# Patient Record
Sex: FEMALE | Race: WHITE | Employment: OTHER | URBAN - METROPOLITAN AREA
[De-identification: names, ages, dates, MRNs, and addresses within clinical notes are randomized per-mention and may not be internally consistent; named-entity substitution may affect disease eponyms.]

---

## 2017-02-01 ENCOUNTER — HOSPITAL ENCOUNTER (OUTPATIENT)
Dept: WOMENS IMAGING | Age: 82
Discharge: HOME | End: 2017-02-01
Payer: MEDICARE

## 2017-02-01 DIAGNOSIS — Z12.39 BREAST CANCER SCREENING: ICD-10-CM

## 2017-02-01 PROCEDURE — G0202 SCR MAMMO BI INCL CAD: HCPCS

## 2018-08-06 ENCOUNTER — OFFICE VISIT (OUTPATIENT)
Dept: CARDIOLOGY CLINIC | Age: 83
End: 2018-08-06
Payer: MEDICARE

## 2018-08-06 VITALS
DIASTOLIC BLOOD PRESSURE: 48 MMHG | HEART RATE: 65 BPM | RESPIRATION RATE: 14 BRPM | WEIGHT: 106.6 LBS | HEIGHT: 60 IN | OXYGEN SATURATION: 98 % | SYSTOLIC BLOOD PRESSURE: 108 MMHG | BODY MASS INDEX: 20.93 KG/M2

## 2018-08-06 DIAGNOSIS — I35.0 NONRHEUMATIC AORTIC VALVE STENOSIS: ICD-10-CM

## 2018-08-06 DIAGNOSIS — K27.9 PUD (PEPTIC ULCER DISEASE): ICD-10-CM

## 2018-08-06 LAB
ALBUMIN SERPL-MCNC: 4.1 G/DL (ref 3.9–4.9)
ALP BLD-CCNC: 46 U/L (ref 40–130)
ALT SERPL-CCNC: 13 U/L (ref 0–33)
ANION GAP SERPL CALCULATED.3IONS-SCNC: 15 MEQ/L (ref 7–13)
AST SERPL-CCNC: 31 U/L (ref 0–35)
BASOPHILS ABSOLUTE: 0 K/UL (ref 0–0.2)
BASOPHILS RELATIVE PERCENT: 0.6 %
BILIRUB SERPL-MCNC: 0.3 MG/DL (ref 0–1.2)
BUN BLDV-MCNC: 14 MG/DL (ref 8–23)
CALCIUM SERPL-MCNC: 9.1 MG/DL (ref 8.6–10.2)
CHLORIDE BLD-SCNC: 94 MEQ/L (ref 98–107)
CO2: 21 MEQ/L (ref 22–29)
CREAT SERPL-MCNC: 0.42 MG/DL (ref 0.5–0.9)
EOSINOPHILS ABSOLUTE: 0.1 K/UL (ref 0–0.7)
EOSINOPHILS RELATIVE PERCENT: 2.8 %
GFR AFRICAN AMERICAN: >60
GFR NON-AFRICAN AMERICAN: >60
GLOBULIN: 2.2 G/DL (ref 2.3–3.5)
GLUCOSE BLD-MCNC: 87 MG/DL (ref 74–109)
HCT VFR BLD CALC: 37.5 % (ref 37–47)
HEMOGLOBIN: 12.9 G/DL (ref 12–16)
LYMPHOCYTES ABSOLUTE: 1 K/UL (ref 1–4.8)
LYMPHOCYTES RELATIVE PERCENT: 18.9 %
MCH RBC QN AUTO: 33 PG (ref 27–31.3)
MCHC RBC AUTO-ENTMCNC: 34.5 % (ref 33–37)
MCV RBC AUTO: 95.7 FL (ref 82–100)
MONOCYTES ABSOLUTE: 0.7 K/UL (ref 0.2–0.8)
MONOCYTES RELATIVE PERCENT: 13.6 %
NEUTROPHILS ABSOLUTE: 3.3 K/UL (ref 1.4–6.5)
NEUTROPHILS RELATIVE PERCENT: 64.1 %
PDW BLD-RTO: 12.7 % (ref 11.5–14.5)
PLATELET # BLD: 217 K/UL (ref 130–400)
POTASSIUM SERPL-SCNC: 5.3 MEQ/L (ref 3.5–5.1)
RBC # BLD: 3.91 M/UL (ref 4.2–5.4)
SODIUM BLD-SCNC: 130 MEQ/L (ref 132–144)
TOTAL PROTEIN: 6.3 G/DL (ref 6.4–8.1)
WBC # BLD: 5.1 K/UL (ref 4.8–10.8)

## 2018-08-06 PROCEDURE — 99213 OFFICE O/P EST LOW 20 MIN: CPT | Performed by: INTERNAL MEDICINE

## 2018-08-06 RX ORDER — OXYBUTYNIN CHLORIDE 10 MG/1
10 TABLET, EXTENDED RELEASE ORAL DAILY
Refills: 3 | COMMUNITY
Start: 2018-07-19 | End: 2019-01-10 | Stop reason: ALTCHOICE

## 2018-08-06 RX ORDER — SIMVASTATIN 20 MG
20 TABLET ORAL NIGHTLY
Refills: 2 | COMMUNITY
Start: 2018-05-21 | End: 2018-11-05 | Stop reason: SDUPTHER

## 2018-08-06 RX ORDER — NITROGLYCERIN 0.4 MG/1
0.4 TABLET SUBLINGUAL EVERY 5 MIN PRN
COMMUNITY

## 2018-08-06 RX ORDER — OMEPRAZOLE 20 MG/1
20 CAPSULE, DELAYED RELEASE ORAL DAILY
COMMUNITY

## 2018-08-06 RX ORDER — METOPROLOL SUCCINATE 25 MG/1
25 TABLET, EXTENDED RELEASE ORAL DAILY
Refills: 1 | COMMUNITY
Start: 2018-05-21 | End: 2018-11-05 | Stop reason: SDUPTHER

## 2018-08-06 RX ORDER — HYDROCODONE BITARTRATE AND ACETAMINOPHEN 5; 325 MG/1; MG/1
1 TABLET ORAL EVERY 6 HOURS PRN
COMMUNITY
End: 2018-11-05 | Stop reason: ALTCHOICE

## 2018-08-06 RX ORDER — LEVOTHYROXINE SODIUM 100 UG/1
0.1 CAPSULE ORAL DAILY
COMMUNITY

## 2018-08-06 RX ORDER — SELENIUM 50 MCG
TABLET ORAL DAILY
Refills: 0 | COMMUNITY
Start: 2018-07-17

## 2018-08-06 RX ORDER — LISINOPRIL 10 MG/1
10 TABLET ORAL DAILY
Refills: 1 | COMMUNITY
Start: 2018-05-21 | End: 2018-11-05 | Stop reason: SDUPTHER

## 2018-08-06 RX ORDER — LEVOTHYROXINE SODIUM 88 UG/1
88 TABLET ORAL DAILY
Refills: 1 | COMMUNITY
Start: 2018-05-07 | End: 2018-08-06 | Stop reason: DRUGHIGH

## 2018-08-06 ASSESSMENT — ENCOUNTER SYMPTOMS
ANAL BLEEDING: 0
ABDOMINAL PAIN: 0
ABDOMINAL DISTENTION: 0
BACK PAIN: 0
EYE PAIN: 0
SHORTNESS OF BREATH: 1
BLOOD IN STOOL: 0
COLOR CHANGE: 0
EYE DISCHARGE: 0

## 2018-08-06 NOTE — PROGRESS NOTES
Subjective:      Patient ID: Sparkle Seymour is a 80 y.o. female who presents today for:  Chief Complaint   Patient presents with    Heart Murmur     Aortic Stenosis    Abnormal Test Results     Abnormal EKG PRWP ETTT Neg 2010         HPI         Sparkle Seymour is a 80 y.o. female who presents for evaluation of shortness of breath. Dyspnea has been of mild severity, generally lasting 2 minute. Episodes usually occur intermittently and have been unchanged. Episodes have been associated with chest pain (Location: epigastric area, Quality: brief) and No other symptoms. .  The symptoms are aggravated by nothing, and relieved by nothing. Allergies   Allergen Reactions    Isoflavones      Other reaction(s): Other: See Comments  ASTHMATIC REACTION    Oatmeal      Other reaction(s): Other: See Comments  Pt states OATS and OATMEAL   causes ASTHMATIC REACTION    Other      Other reaction(s): Other: See Comments  Pt states COFFEE, TEA    causes ASTHMATIC REACTION    Prune      Other reaction(s): Other: See Comments  ASTHMATIC REACTION    Sweet Potato      Other reaction(s): Other: See Comments  CASUSES ASTHMATIC REACTION    Tomato      Other reaction(s): Other: See Comments  Fresh tomatoes cause ASTHMATIC REACTION           Past Medical History:   Diagnosis Date    Asthma     Colon abnormality     Hypertension     Thyroid disease      Past Surgical History:   Procedure Laterality Date    CHOLECYSTECTOMY      DIAGNOSTIC CARDIAC CATH LAB PROCEDURE  07/10/2007/9/21/13    EYE SURGERY      HAND FUSION Right     HYSTERECTOMY      JOINT REPLACEMENT Left 06/05/2006    hip    SHOULDER SURGERY Left     9/10/2013     Social History     Social History    Marital status: Single     Spouse name: N/A    Number of children: N/A    Years of education: N/A     Occupational History    Not on file.      Social History Main Topics    Smoking status: Never Smoker    Smokeless tobacco: Never Used    Alcohol use No    Drug use: Unknown    Sexual activity: Not on file     Other Topics Concern    Not on file     Social History Narrative    No narrative on file     Family History   Problem Relation Age of Onset    Asthma Mother     Cancer Mother         ovarian     Allergies   Allergen Reactions    Isoflavones      Other reaction(s): Other: See Comments  ASTHMATIC REACTION    Oatmeal      Other reaction(s): Other: See Comments  Pt states OATS and OATMEAL   causes ASTHMATIC REACTION    Other      Other reaction(s): Other: See Comments  Pt states COFFEE, TEA    causes ASTHMATIC REACTION    Prune      Other reaction(s): Other: See Comments  ASTHMATIC REACTION    Sweet Potato      Other reaction(s): Other: See Comments  CASUSES ASTHMATIC REACTION    Tomato      Other reaction(s): Other: See Comments  Fresh tomatoes cause ASTHMATIC REACTION         Review of Systems   Constitutional: Positive for fatigue (No change. ). Negative for activity change, appetite change, chills, diaphoresis, fever and unexpected weight change. HENT: Negative for congestion. Eyes: Negative for pain and discharge. Respiratory: Positive for shortness of breath (fair for her age. ). Cardiovascular: Negative for chest pain, palpitations and leg swelling. Gastrointestinal: Negative for abdominal distention, abdominal pain, anal bleeding and blood in stool. Endocrine: Negative for cold intolerance, heat intolerance, polydipsia, polyphagia and polyuria. Genitourinary: Negative for hematuria. Musculoskeletal: Negative for back pain and gait problem. Skin: Negative for color change. Neurological: Negative for dizziness, tremors, seizures, syncope, facial asymmetry, speech difficulty, numbness and headaches.        Objective:   BP (!) 108/48 (Site: Right Arm, Position: Sitting, Cuff Size: Medium Adult)   Pulse 65   Resp 14   Ht 5' (1.524 m)   Wt 106 lb 9.6 oz (48.4 kg)   LMP  (LMP Unknown)   SpO2 98%   BMI 20.82 kg/m²     Physical

## 2018-11-05 ENCOUNTER — OFFICE VISIT (OUTPATIENT)
Dept: CARDIOLOGY CLINIC | Age: 83
End: 2018-11-05
Payer: MEDICARE

## 2018-11-05 VITALS
DIASTOLIC BLOOD PRESSURE: 60 MMHG | HEIGHT: 60 IN | OXYGEN SATURATION: 97 % | BODY MASS INDEX: 21.13 KG/M2 | SYSTOLIC BLOOD PRESSURE: 110 MMHG | WEIGHT: 107.6 LBS | HEART RATE: 66 BPM

## 2018-11-05 DIAGNOSIS — R53.83 FATIGUE, UNSPECIFIED TYPE: Primary | ICD-10-CM

## 2018-11-05 DIAGNOSIS — R01.1 SYSTOLIC MURMUR: ICD-10-CM

## 2018-11-05 PROCEDURE — 99213 OFFICE O/P EST LOW 20 MIN: CPT | Performed by: INTERNAL MEDICINE

## 2018-11-05 RX ORDER — LISINOPRIL 10 MG/1
10 TABLET ORAL DAILY
Qty: 30 TABLET | Refills: 5 | Status: SHIPPED | OUTPATIENT
Start: 2018-11-05 | End: 2019-03-11 | Stop reason: SDUPTHER

## 2018-11-05 RX ORDER — METOPROLOL SUCCINATE 25 MG/1
25 TABLET, EXTENDED RELEASE ORAL DAILY
Qty: 30 TABLET | Refills: 5 | Status: SHIPPED | OUTPATIENT
Start: 2018-11-05

## 2018-11-05 RX ORDER — SIMVASTATIN 20 MG
20 TABLET ORAL NIGHTLY
Qty: 30 TABLET | Refills: 5 | Status: SHIPPED | OUTPATIENT
Start: 2018-11-05 | End: 2019-05-07 | Stop reason: SDUPTHER

## 2018-11-12 ENCOUNTER — HOSPITAL ENCOUNTER (OUTPATIENT)
Dept: NON INVASIVE DIAGNOSTICS | Age: 83
Discharge: HOME | End: 2018-11-12
Payer: MEDICARE

## 2018-11-12 DIAGNOSIS — R53.83 FATIGUE, UNSPECIFIED TYPE: ICD-10-CM

## 2018-11-12 DIAGNOSIS — R01.1 SYSTOLIC MURMUR: ICD-10-CM

## 2018-11-12 LAB
LV EF: 60 %
LVEF MODALITY: NORMAL

## 2018-11-12 PROCEDURE — 93306 TTE W/DOPPLER COMPLETE: CPT

## 2019-01-10 DIAGNOSIS — C56.9 MALIGNANT NEOPLASM OF OVARY, UNSPECIFIED LATERALITY (HCC): ICD-10-CM

## 2019-01-10 LAB
ALBUMIN SERPL-MCNC: 4.3 G/DL (ref 3.9–4.9)
ALP BLD-CCNC: 47 U/L (ref 40–130)
ALT SERPL-CCNC: 12 U/L (ref 0–33)
ANION GAP SERPL CALCULATED.3IONS-SCNC: 13 MEQ/L (ref 7–13)
AST SERPL-CCNC: 24 U/L (ref 0–35)
BILIRUB SERPL-MCNC: 0.4 MG/DL (ref 0–1.2)
BUN BLDV-MCNC: 15 MG/DL (ref 8–23)
CALCIUM SERPL-MCNC: 9.3 MG/DL (ref 8.6–10.2)
CHLORIDE BLD-SCNC: 98 MEQ/L (ref 98–107)
CO2: 22 MEQ/L (ref 22–29)
CREAT SERPL-MCNC: 0.56 MG/DL (ref 0.5–0.9)
GFR AFRICAN AMERICAN: >60
GFR NON-AFRICAN AMERICAN: >60
GLOBULIN: 2.5 G/DL (ref 2.3–3.5)
GLUCOSE BLD-MCNC: 143 MG/DL (ref 74–109)
POTASSIUM SERPL-SCNC: 4.1 MEQ/L (ref 3.5–5.1)
SODIUM BLD-SCNC: 133 MEQ/L (ref 132–144)
TOTAL PROTEIN: 6.8 G/DL (ref 6.4–8.1)

## 2019-03-11 ENCOUNTER — OFFICE VISIT (OUTPATIENT)
Dept: CARDIOLOGY CLINIC | Age: 84
End: 2019-03-11
Payer: MEDICARE

## 2019-03-11 VITALS
BODY MASS INDEX: 21.36 KG/M2 | WEIGHT: 108.8 LBS | DIASTOLIC BLOOD PRESSURE: 60 MMHG | HEART RATE: 70 BPM | HEIGHT: 60 IN | OXYGEN SATURATION: 98 % | SYSTOLIC BLOOD PRESSURE: 110 MMHG

## 2019-03-11 DIAGNOSIS — R01.1 SYSTOLIC MURMUR: Primary | ICD-10-CM

## 2019-03-11 DIAGNOSIS — R53.83 FATIGUE, UNSPECIFIED TYPE: ICD-10-CM

## 2019-03-11 PROCEDURE — 99213 OFFICE O/P EST LOW 20 MIN: CPT | Performed by: INTERNAL MEDICINE

## 2019-03-11 RX ORDER — LISINOPRIL 5 MG/1
5 TABLET ORAL DAILY
Qty: 90 TABLET | Refills: 1 | Status: SHIPPED | OUTPATIENT
Start: 2019-03-11

## 2019-03-11 RX ORDER — LISINOPRIL 10 MG/1
TABLET ORAL
Qty: 30 TABLET | Refills: 5 | Status: SHIPPED | OUTPATIENT
Start: 2019-03-11 | End: 2019-03-11

## 2019-03-11 ASSESSMENT — ENCOUNTER SYMPTOMS
CHEST TIGHTNESS: 0
SHORTNESS OF BREATH: 0
APNEA: 0

## 2019-03-11 NOTE — PROGRESS NOTES
[facility] Cardiology Progress Note  Patient: Marni Freed  YOB: 1935  MRN: [id number]    Chief Complaint:  Chief Complaint   Patient presents with    Coronary Artery Disease   11/5/18 Stent is seen in the office for her continued problem with fatigue and dizziness. She states when she wakes up in the morning she feels very tired. She takes Tylenol arthritis in the morning. In the past she has been taking Norco.  She denies any chest pain palpitations syncope or near syncope although she gets dizzy when she is working with the arms above the head. This has been a long-term problem for her.         Subjective/HPI: 3/11/2019  Marni Freed  Pryor STABLE. nO cp. Pryor IS STABLE. dENIES ANY OTHER CARDIAC SXS     Discussion/Plan CONTINUE MEDICAL MANAGEMENT. STRESS TEST WANTS TO WAIT.     Past Medical History:   Diagnosis Date    Asthma     Colon abnormality     Hypertension     Thyroid disease        Past Surgical History:   Procedure Laterality Date    CHOLECYSTECTOMY      DIAGNOSTIC CARDIAC CATH LAB PROCEDURE  07/10/2007/9/21/13    EYE SURGERY      HAND FUSION Right     HYSTERECTOMY      JOINT REPLACEMENT Left 06/05/2006    hip    SHOULDER SURGERY Left     9/10/2013       Family History   Problem Relation Age of Onset    Asthma Mother     Cancer Mother         ovarian       Social History     Socioeconomic History    Marital status: Single     Spouse name: None    Number of children: None    Years of education: None    Highest education level: None   Occupational History    None   Social Needs    Financial resource strain: None    Food insecurity:     Worry: None     Inability: None    Transportation needs:     Medical: None     Non-medical: None   Tobacco Use    Smoking status: Never Smoker    Smokeless tobacco: Never Used   Substance and Sexual Activity    Alcohol use: No    Drug use: None    Sexual activity: None   Lifestyle    Physical activity:     Days per week: None     Minutes per session: None    Stress: None   Relationships    Social connections:     Talks on phone: None     Gets together: None     Attends Qu[employer] service: None     Active member of club or organization: None     Attends meetings of clubs or organizations: None     Relationship status: None    Intimate partner violence:     Fear of current or ex partner: None     Emotionally abused: None     Physically abused: None     Forced sexual activity: None   Other Topics Concern    None   Social History Narrative    None       Allergies   Allergen Reactions    Isoflavones      Other reaction(s): Other: See Comments  ASTHMATIC REACTION    Oatmeal      Other reaction(s): Other: See Comments  Pt states OATS and OATMEAL   causes ASTHMATIC REACTION    Other      Other reaction(s): Other: See Comments  Pt states COFFEE, TEA    causes ASTHMATIC REACTION    Prune      Other reaction(s): Other: See Comments  ASTHMATIC REACTION    Sweet Potato      Other reaction(s): Other: See Comments  CASUSES ASTHMATIC REACTION    Tomato      Other reaction(s): Other: See Comments  Fresh tomatoes cause ASTHMATIC REACTION       Current Outpatient Medications   Medication Sig Dispense Refill    lisinopril (PRINIVIL;ZESTRIL) 10 MG tablet 1/2 TAB DAILY 30 tablet 5    Multiple Vitamins-Minerals (ICAPS LUTEIN & OMEGA-3) CAPS Take 1 capsule by mouth every morning      ferrous sulfate 325 (65 Fe) MG tablet Take 325 mg by mouth daily (with breakfast)      methylcellulose (CITRUCEL) oral powder Take 2 g by mouth 2 times daily Take by mouth daily.       polyethylene glycol (GLYCOLAX) packet Take 17 g by mouth daily as needed for Constipation      Ascorbic Acid (VITAMIN C) 250 MG tablet Take 250 mg by mouth 2 times daily      Polyvinyl Alcohol-Povidone (REFRESH OP) Apply 1 drop to eye nightly      acetaminophen (TYLENOL 8 HOUR ARTHRITIS PAIN) 650 MG extended release tablet Take 650 mg by mouth every 8 hours as needed for Pain      Pseudoephedrine HCl (SUDAFED 12 HOUR PO) Take 1 tablet by mouth 2 times daily      metoclopramide (REGLAN) 5 MG tablet Take 5 mg by mouth as needed      HYDROcodone-acetaminophen (NORCO) 5-325 MG per tablet Take 1 tablet by mouth every 6 hours as needed for Pain. Ozell Lung metoprolol succinate (TOPROL XL) 25 MG extended release tablet Take 1 tablet by mouth daily 30 tablet 5    simvastatin (ZOCOR) 20 MG tablet Take 1 tablet by mouth nightly 30 tablet 5    Lactobacillus (ACIDOPHILUS) CAPS capsule daily  0    aspirin 81 MG tablet Take 81 mg by mouth daily      omeprazole (PRILOSEC) 20 MG delayed release capsule Take 20 mg by mouth daily      Levothyroxine Sodium 100 MCG CAPS Take 0.1 mcg by mouth Daily      vitamin B-12 (CYANOCOBALAMIN) 250 MCG tablet Take 250 mcg by mouth daily      calcium carbonate-vitamin D3 (CALCIUM 600/VITAMIN D) 600-400 MG-UNIT TABS Take by mouth      nitroGLYCERIN (NITROSTAT) 0.4 MG SL tablet Place 0.4 mg under the tongue every 5 minutes as needed for Chest pain up to max of 3 total doses. If no relief after 1 dose, call 911. No current facility-administered medications for this visit. Review of Systems:   Review of Systems   Constitutional: Negative for appetite change, diaphoresis and fatigue. Respiratory: Negative for apnea, chest tightness and shortness of breath. Cardiovascular: Negative for chest pain, palpitations and leg swelling. Skin: Negative. Neurological: Negative for dizziness, syncope, weakness, light-headedness and headaches. Psychiatric/Behavioral: Negative for agitation, behavioral problems, confusion and decreased concentration. The patient is not nervous/anxious and is not hyperactive.           Physical Examination:    /60 (Site: Right Upper Arm, Position: Sitting, Cuff Size: Small Adult)   Pulse 70   Ht 4' 11.5\" (1.511 m)   Wt 108 lb 12.8 oz (49.4 kg)   SpO2 98%   BMI 21.61 kg/m²    Physical Exam   Constitutional: She appears healthy. No distress. HENT:   Nose: Nose normal.   Mouth/Throat: Oropharynx is clear. Eyes: Pupils are equal, round, and reactive to light. Conjunctivae are normal.   Neck: Normal range of motion. Neck supple. No JVD present. No neck adenopathy. No thyromegaly present. Cardiovascular: Regular rhythm, S1 normal, S2 normal and intact distal pulses. PMI is displaced. Exam reveals gallop. Exam reveals no S4, no distant heart sounds, no friction rub and no midsystolic click. Murmur heard. Harsh midsystolic murmur is present with a grade of 2/6 at the upper right sternal border radiating to the neck. Pulses:       Carotid pulses are 2+ on the right side, and 2+ on the left side. Radial pulses are 2+ on the right side, and 2+ on the left side. Posterior tibial pulses are 2+ on the right side, and 2+ on the left side. Pulmonary/Chest: Effort normal and breath sounds normal. No stridor. She has no wheezes. She has no rales. She exhibits no tenderness. Abdominal: Bowel sounds are normal. She exhibits no distension. There is no tenderness. Musculoskeletal: She exhibits no edema, tenderness or deformity. Neurological: She is alert and oriented to person, place, and time. She has normal motor skills and intact cranial nerves. Skin: Skin is warm and moist. No rash noted. No jaundice or pallor.        LABS:  CBC:   Lab Results   Component Value Date    WBC 6.6 01/10/2019    RBC 3.91 08/06/2018    HGB 13.1 01/10/2019    HCT 39.9 01/10/2019    MCV 95.7 08/06/2018    MCH 33.0 08/06/2018    MCHC 34.5 08/06/2018    RDW 12.7 08/06/2018     01/10/2019     CMP:    Lab Results   Component Value Date     01/10/2019    K 4.1 01/10/2019    CL 98 01/10/2019    CO2 22 01/10/2019    BUN 15 01/10/2019    CREATININE 0.56 01/10/2019    GFRAA >60.0 01/10/2019    LABGLOM >60.0 01/10/2019    GLUCOSE 143 01/10/2019    GLUCOSE 93 06/04/2012    PROT 6.8 01/10/2019    LABALBU 4.3 01/10/2019    LABALBU 4.4 2012    CALCIUM 9.3 01/10/2019    BILITOT 0.4 01/10/2019    ALKPHOS 47 01/10/2019    AST 24 01/10/2019    ALT 12 01/10/2019     BMP:    Lab Results   Component Value Date     01/10/2019    K 4.1 01/10/2019    CL 98 01/10/2019    CO2 22 01/10/2019    BUN 15 01/10/2019    LABALBU 4.3 01/10/2019    LABALBU 4.4 2012    CREATININE 0.56 01/10/2019    CALCIUM 9.3 01/10/2019    GFRAA >60.0 01/10/2019    LABGLOM >60.0 01/10/2019    GLUCOSE 143 01/10/2019    GLUCOSE 93 2012     Magnesium:  No results found for: MG  TSH:No results found for: TSHFT4, TSH    Patient Active Problem List   Diagnosis    Malignant neoplasm of ovary ([address]    Mass on back    Aortic stenosis    PUD (peptic ulcer disease)         Orders Placed This Encounter   Medications    lisinopril (PRINIVIL;ZESTRIL) 10 MG tablet     Si/2 TAB DAILY     Dispense:  30 tablet     Refill:  5       Assessment/Plan:    1. Systolic murmur  AORTIC SCLEROSIS TRIVIAL AI, MILD MR    2. Fatigue, unspecified type  CHRONIC. SEE PCP       Counseling:  Coronary anatomy and symptoms related to CAD /Atherosclerosis were discussed in detail. Patient expressed understanding of these issues. Risk factors for atherosclerosis and modification explained in detail. Laboratory data explained. Importance of heart healthy diet discussed again. Daily walk for 30 minutes or 180 minutes a week strongly recommended. Patient must report any change in functional capacity as explained. Palpitation if any must sit down , check BP and HR , if any associated symptoms or symptom persist must go to the ER. Use of nitrostat explained. Patient must maintain close follow up with PCP and discuss further cardiac and non cardiac issues with PCP. Patient must maintain regular and PRN follow up in our clinic. Ulysses Weaverant /family was allowed adequate time to ask questions.     Return in about 6 months (around 2019), or if symptoms worsen or fail to improve.     Electronically signed by Ankita Amaral MD on 3/11/2019 at 11:10 AM        (Please note that portions of this note were completed with a voice recognition program.  Efforts were made to edit the dictations but occasionally words are mis-transcribed.)

## 2019-05-17 RX ORDER — SIMVASTATIN 20 MG
20 TABLET ORAL NIGHTLY
Qty: 90 TABLET | Refills: 2 | Status: SHIPPED | OUTPATIENT
Start: 2019-05-17

## 2019-09-09 ENCOUNTER — OFFICE VISIT (OUTPATIENT)
Dept: CARDIOLOGY CLINIC | Age: 84
End: 2019-09-09
Payer: MEDICARE

## 2019-09-09 VITALS
OXYGEN SATURATION: 98 % | HEART RATE: 69 BPM | WEIGHT: 104 LBS | BODY MASS INDEX: 20.42 KG/M2 | HEIGHT: 60 IN | RESPIRATION RATE: 18 BRPM | DIASTOLIC BLOOD PRESSURE: 80 MMHG | SYSTOLIC BLOOD PRESSURE: 118 MMHG

## 2019-09-09 DIAGNOSIS — R01.1 SYSTOLIC MURMUR: Primary | ICD-10-CM

## 2019-09-09 DIAGNOSIS — R53.83 FATIGUE, UNSPECIFIED TYPE: ICD-10-CM

## 2019-09-09 DIAGNOSIS — I35.0 NONRHEUMATIC AORTIC VALVE STENOSIS: ICD-10-CM

## 2019-09-09 PROCEDURE — 99214 OFFICE O/P EST MOD 30 MIN: CPT | Performed by: INTERNAL MEDICINE

## 2019-09-09 ASSESSMENT — ENCOUNTER SYMPTOMS
VOMITING: 0
NAUSEA: 0
SHORTNESS OF BREATH: 0
COLOR CHANGE: 0
CHEST TIGHTNESS: 0
APNEA: 0
BLOOD IN STOOL: 0
DIARRHEA: 0

## 2019-09-09 NOTE — PROGRESS NOTES
Take 650 mg by mouth every 8 hours as needed for Pain      Pseudoephedrine HCl (SUDAFED 12 HOUR PO) Take 1 tablet by mouth 2 times daily      metoclopramide (REGLAN) 5 MG tablet Take 5 mg by mouth as needed      HYDROcodone-acetaminophen (NORCO) 5-325 MG per tablet Take 1 tablet by mouth every 6 hours as needed for Pain. Chantelle Bar metoprolol succinate (TOPROL XL) 25 MG extended release tablet Take 1 tablet by mouth daily 30 tablet 5    Lactobacillus (ACIDOPHILUS) CAPS capsule daily  0    aspirin 81 MG tablet Take 81 mg by mouth daily      omeprazole (PRILOSEC) 20 MG delayed release capsule Take 20 mg by mouth daily      Levothyroxine Sodium 100 MCG CAPS Take 0.1 mcg by mouth Daily      vitamin B-12 (CYANOCOBALAMIN) 250 MCG tablet Take 250 mcg by mouth daily      calcium carbonate-vitamin D3 (CALCIUM 600/VITAMIN D) 600-400 MG-UNIT TABS Take by mouth      nitroGLYCERIN (NITROSTAT) 0.4 MG SL tablet Place 0.4 mg under the tongue every 5 minutes as needed for Chest pain up to max of 3 total doses. If no relief after 1 dose, call 911. No current facility-administered medications for this visit. Review of Systems:   Review of Systems   Constitutional: Negative for appetite change, diaphoresis and fatigue. HENT: Negative for nosebleeds. Respiratory: Negative for apnea, chest tightness and shortness of breath. Cardiovascular: Negative for chest pain, palpitations and leg swelling. Gastrointestinal: Negative for blood in stool, diarrhea, nausea and vomiting. Musculoskeletal: Negative for myalgias, neck pain and neck stiffness. Skin: Negative for color change, pallor, rash and wound. Neurological: Negative for dizziness, seizures, syncope, weakness, light-headedness, numbness and headaches. Hematological: Does not bruise/bleed easily. Psychiatric/Behavioral: Negative for agitation, behavioral problems and confusion. The patient is not nervous/anxious and is not hyperactive.     All other

## 2020-04-28 LAB — SURGICAL PATHOLOGY REPORT: NORMAL

## 2020-05-03 LAB
C-REACTIVE PROTEIN: 0.26 MG/DL
D DIMER: 3309 NG/ML FEU
FERRITIN: 355 UG/L (ref 8–150)
PROCALCITONIN: 0.21 NG/ML
SARS-COV-2, PCR: NOT DETECTED
SPECIMEN SOURCE: NORMAL